# Patient Record
Sex: MALE | Race: WHITE
[De-identification: names, ages, dates, MRNs, and addresses within clinical notes are randomized per-mention and may not be internally consistent; named-entity substitution may affect disease eponyms.]

---

## 2017-06-17 NOTE — PD
HPI


Chief Complaint:  GI Complaint


Time Seen by Provider:  17:57


Travel History


International Travel<30 days:  No


Contact w/Intl Traveler<30days:  No


Traveled to known affect area:  No





History of Present Illness


HPI


51-year-old male presents emergency department for evaluation of generalized 

muscle cramping.  Patient states he has a history of renal cancer with a 

unilateral nephrectomy.  He is on chronic by mouth chemotherapy.  He is 

currently moving to the area and has been here for a week has not established 

with a oncologist in the area as of yet.  Does endorse some nonbloody diarrhea 

for the past week.  States his been told his potassium is been low in the past 

but he states the cramps of never been quite this bad.  Been taking Tylenol at 

home for the pain.  Thinks that a muscle relaxer might be helpful to him.  

Denies any fever.    Denies any chest pain abdominal pain nausea or vomiting.





PFSH


Past Medical History


ADHD:  Yes


Cancer:  Yes (STAGE 4 RENAL WITH METS)


Chemotherapy:  Yes


Thyroid Disease:  Yes (HYPO)


Tetanus Vaccination:  Unknown


Influenza Vaccination:  Yes





Past Surgical History


Genitourinary Surgery:  Yes (RIGHT NEPHRECTOMY)


Mastectomy:  Yes (RIGHT SIDE 20 YEARS AGO)


Tonsillectomy:  Yes


Other Surgery:  Yes (DEVIATED SEPTUM)





Social History


Alcohol Use:  No


Tobacco Use:  No


Substance Use:  No





Allergies-Medications


(Allergen,Severity, Reaction):  


Coded Allergies:  


     Erythromycin (Verified  Allergy, Unknown, Hives, 6/17/17)


Reported Meds & Prescriptions





Reported Meds & Active Scripts


Active


Flexeril (Cyclobenzaprine HCl) 10 Mg Tab 10 Mg PO TID PRN


Calcium Carbonate 1,500 Mg Tab 1,500 Mg PO DAILY 7 Days


     1,500 mg calcium carbonate (600 mg elemental calcium)


Levothyroxine (Levothyroxine Sodium) 200 Mcg Tab 200 Mcg PO DAILY


Reported


Vitamin B-12 (Cyanocobalamin) 1,000 Mcg Subl 1,000 Mcg SL DAILY


Tramadol (Tramadol HCl) 50 Mg Tab 50 Mg PO Q4H PRN


Linzess (Linaclotide) 145 Mcg Cap 145 Mcg PO DAILY


Alprazolam 0.25 Mg Tab 0.25 Mg PO DAILY


Ritalin IR (Methylphenidate HCl) 5 Mg Tab 5 Mg PO DAILY


Prochlorperazine Maleate 10 Mg Tab 10 Mg PO Q6H PRN


Senna (Sennosides) 8.8 Mg/5 Ml Syp 1 Tab PO DAILY


Pantoprazole (Pantoprazole Sodium) 40 Mg Tab 40 Mg PO DAILY


Fentanyl Patch 72 HR (Fentanyl) 25 Mcg/Hr Patch 25 Mcg T-DERMAL Q72H


Cabometyx (Cabozantinib S-Malate) 60 Mg Tablet 1 Tab PO DAILY








Review of Systems


Except as stated in HPI:  all other systems reviewed are Neg





Physical Exam


Narrative


GENERAL: Well-developed well-nourished, no apparent distress.


SKIN: Focused skin assessment warm/dry.


HEAD: Atraumatic. Normocephalic. 


EYES: Pupils equal and round. No scleral icterus. No injection or drainage. 


ENT: No nasal bleeding or discharge.  Mucous membranes pink and moist.


NECK: Trachea midline. No JVD. 


CARDIOVASCULAR: Regular rate and rhythm.  No murmur appreciated.


RESPIRATORY: No accessory muscle use. Clear to auscultation. Breath sounds 

equal bilaterally. 


GASTROINTESTINAL: Abdomen soft, non-tender, nondistended. Hepatic and splenic 

margins not palpable. 


MUSCULOSKELETAL: No obvious deformities. No clubbing.  No cyanosis.  No edema. 


NEUROLOGICAL: Awake and alert. No obvious cranial nerve deficits.  Motor 

grossly within normal limits. Normal speech.


PSYCHIATRIC: Appropriate mood and affect; insight and judgment normal.





Data


Data


Last Documented VS





Vital Signs








  Date Time  Temp Pulse Resp B/P Pulse Ox O2 Delivery O2 Flow Rate FiO2


 


6/17/17 20:45  78 18 132/80 97 Room Air  


 


6/17/17 18:55 98.1       








Orders





 Complete Blood Count With Diff (6/17/17 17:57)


Comprehensive Metabolic Panel (6/17/17 17:57)


Lipase (6/17/17 17:57)


Iv Access Insert/Monitor (6/17/17 17:57)


Ecg Monitoring (6/17/17 17:57)


Oximetry (6/17/17 17:57)


Sodium Chlor 0.9% 1000 Ml Inj (Ns 1000 M (6/17/17 17:57)


Sodium Chloride 0.9% Flush (Ns Flush) (6/17/17 18:00)


Electrocardiogram (6/17/17 17:57)


Cyclobenzaprine (Flexeril) (6/17/17 18:00)


Calcium Gluconate Inj (Calcium Gluconate (6/17/17 18:45)


Oxycodone-Acetamin 5-325 Mg (Percocet (6/17/17 19:00)


Ondansetron Inj (Zofran Inj) (6/17/17 19:00)


Thyroid Stimulating Hormone (6/17/17 18:10)





Labs





 Laboratory Tests








Test 6/17/17





 18:10


 


White Blood Count 5.4 TH/MM3


 


Red Blood Count 4.59 MIL/MM3


 


Hemoglobin 15.4 GM/DL


 


Hematocrit 46.4 %


 


Mean Corpuscular Volume 101.0 FL


 


Mean Corpuscular Hemoglobin 33.5 PG


 


Mean Corpuscular Hemoglobin 33.1 %





Concent 


 


Red Cell Distribution Width 14.4 %


 


Platelet Count 218 TH/MM3


 


Mean Platelet Volume 7.9 FL


 


Neutrophils (%) (Auto) 62.6 %


 


Lymphocytes (%) (Auto) 27.2 %


 


Monocytes (%) (Auto) 5.0 %


 


Eosinophils (%) (Auto) 4.3 %


 


Basophils (%) (Auto) 0.9 %


 


Neutrophils # (Auto) 3.4 TH/MM3


 


Lymphocytes # (Auto) 1.5 TH/MM3


 


Monocytes # (Auto) 0.3 TH/MM3


 


Eosinophils # (Auto) 0.2 TH/MM3


 


Basophils # (Auto) 0.0 TH/MM3


 


CBC Comment DIFF FINAL 


 


Differential Comment  


 


Sodium Level 141 MEQ/L


 


Potassium Level 3.3 MEQ/L


 


Chloride Level 109 MEQ/L


 


Carbon Dioxide Level 22.0 MEQ/L


 


Anion Gap 10 MEQ/L


 


Blood Urea Nitrogen 19 MG/DL


 


Creatinine 1.50 MG/DL


 


Estimat Glomerular Filtration 50 ML/MIN





Rate 


 


Random Glucose 104 MG/DL


 


Calcium Level 6.1 MG/DL


 


Protein Corrected Calcium 6.0 MG/DL


 


Total Bilirubin 0.7 MG/DL


 


Aspartate Amino Transf 56 U/L





(AST/SGOT) 


 


Alanine Aminotransferase 106 U/L





(ALT/SGPT) 


 


Alkaline Phosphatase 33 U/L


 


Total Protein 7.6 GM/DL


 


Albumin 3.5 GM/DL


 


Lipase 234 U/L


 


Thyroid Stimulating Hormone 35.600 uIU/ML





3rd Gen 











[facility]


Medical Decision Making


Medical Screen Exam Complete:  Yes


Emergency Medical Condition:  Yes


Interpretation(s)


EKG shows normal sinus rhythm with normal axis normal R-wave progression.  

Intervals completely within normal limits.  No concerning ST segment changes.  

This is a normal EKG.


Differential Diagnosis


Dehydration, electrolyte abnormality, diarrhea, acute abdomen is excluded 

clinically.


Narrative Course


Patient roomed emergency department, found to be notably hypocalcemic, was 

given 2 g of calcium gluconate IV, calcium carbonate to be prescribed.  Patient 

has had mild cramping which was easily controlled with Flexeril.  Discussed 

with the patient need for supplementation at home, patient then told me that he 

was having some low TSH and a recent blood test, he is currently 175 g daily 

will increase to 200 g daily after confirming his low TSH here.  Discussed he 

needs to establish with a primary care physician in [facility] if he plans to stay 

and he is working on that.  Discussed needs to have his labs rechecked in a 

week and he is welcome to return here.  Discussed return to ED criteria.  He is 

stable for discharge.





Diagnosis





 Primary Impression:  


 Hypocalcemia


 Additional Impressions:  


 Hypothyroidism


 Chronic diarrhea


***Med/Other Pt SpecificInfo:  Prescription(s) given


Scripts


Cyclobenzaprine (Flexeril)10 Mg Tab10 Mg PO TID PRN (MUSCLE SPASM) #30 TAB  Ref 

0


   Prov:Roger Mendoza MD         6/17/17 


Calcium Carbonate 1,500 Mg Tab1,500 Mg PO DAILY  7 Days  Ref 0


   1,500 mg calcium carbonate (600 mg elemental calcium)


   Prov:Roger Mendoza MD         6/17/17 


Levothyroxine 200 Mcg [id number] Mcg PO DAILY  #30 TAB  Ref 0


   Prov:Roger Mendoza MD         6/17/17


Disposition:  01 DISCHARGE HOME


Condition:  Stable








Roger Mendoza MD Jun 17, 2017 19:25

## 2017-06-18 NOTE — EKG
Date Performed: 06/17/2017       Time Performed: 18:07:26

 

PTAGE:      51 years

 

EKG:      Sinus rhythm 

 

 NORMAL ECG 

 

NO PREVIOUS TRACING            

 

DOCTOR:   Clifford Beauchamp  Interpretating Date/Time  06/18/2017 12:19:18

## 2018-01-28 ENCOUNTER — HOSPITAL ENCOUNTER (EMERGENCY)
Dept: HOSPITAL 17 - PHED | Age: 53
Discharge: HOME | End: 2018-01-28
Payer: COMMERCIAL

## 2018-01-28 VITALS
SYSTOLIC BLOOD PRESSURE: 124 MMHG | DIASTOLIC BLOOD PRESSURE: 65 MMHG | OXYGEN SATURATION: 98 % | RESPIRATION RATE: 16 BRPM | TEMPERATURE: 97.7 F | HEART RATE: 87 BPM

## 2018-01-28 VITALS — BODY MASS INDEX: 20.9 KG/M2 | WEIGHT: 141.1 LBS | HEIGHT: 69 IN

## 2018-01-28 VITALS — SYSTOLIC BLOOD PRESSURE: 128 MMHG | DIASTOLIC BLOOD PRESSURE: 73 MMHG

## 2018-01-28 VITALS
OXYGEN SATURATION: 99 % | DIASTOLIC BLOOD PRESSURE: 57 MMHG | SYSTOLIC BLOOD PRESSURE: 119 MMHG | RESPIRATION RATE: 16 BRPM | HEART RATE: 87 BPM

## 2018-01-28 VITALS
SYSTOLIC BLOOD PRESSURE: 114 MMHG | OXYGEN SATURATION: 98 % | HEART RATE: 81 BPM | DIASTOLIC BLOOD PRESSURE: 55 MMHG | RESPIRATION RATE: 16 BRPM

## 2018-01-28 DIAGNOSIS — K52.9: Primary | ICD-10-CM

## 2018-01-28 DIAGNOSIS — E87.6: ICD-10-CM

## 2018-01-28 DIAGNOSIS — C64.9: ICD-10-CM

## 2018-01-28 LAB
AMORPHOUS SEDIMENT, URINE: (no result)
BUN SERPL-MCNC: 11 MG/DL (ref 7–18)
CALCIUM SERPL-MCNC: 7.7 MG/DL (ref 8.5–10.1)
CHLORIDE SERPL-SCNC: 110 MEQ/L (ref 98–107)
COLOR UR: YELLOW
CREAT SERPL-MCNC: 0.92 MG/DL (ref 0.6–1.3)
GFR SERPLBLD BASED ON 1.73 SQ M-ARVRAT: 87 ML/MIN (ref 89–?)
GLUCOSE SERPL-MCNC: 85 MG/DL (ref 74–106)
GLUCOSE UR STRIP-MCNC: (no result) MG/DL
HCO3 BLD-SCNC: 27.8 MEQ/L (ref 21–32)
HGB UR QL STRIP: (no result)
KETONES UR STRIP-MCNC: (no result) MG/DL
NITRITE UR QL STRIP: (no result)
RBC #/AREA URNS HPF: (no result) /HPF (ref 0–3)
SODIUM SERPL-SCNC: 144 MEQ/L (ref 136–145)
SP GR UR STRIP: 1.02 (ref 1–1.03)
SQUAMOUS #/AREA URNS HPF: (no result) /HPF (ref 0–5)
URINE LEUKOCYTE ESTERASE: (no result)

## 2018-01-28 PROCEDURE — 87493 C DIFF AMPLIFIED PROBE: CPT

## 2018-01-28 PROCEDURE — 96360 HYDRATION IV INFUSION INIT: CPT

## 2018-01-28 PROCEDURE — 99284 EMERGENCY DEPT VISIT MOD MDM: CPT

## 2018-01-28 PROCEDURE — 81001 URINALYSIS AUTO W/SCOPE: CPT

## 2018-01-28 PROCEDURE — 80048 BASIC METABOLIC PNL TOTAL CA: CPT

## 2018-01-28 PROCEDURE — 96372 THER/PROPH/DIAG INJ SC/IM: CPT

## 2018-01-28 NOTE — PD
HPI


Chief Complaint:  GI Complaint


Time Seen by Provider:  10:07


Travel History


International Travel<30 days:  Yes


Contact w/Intl Traveler<30days:  Yes


Name of Country Traveled to:  [place], [place]


Traveled to known affect area:  No





History of Present Illness


HPI


Patient presents with concerns of urinary tract infection and dehydration.  

Reports right sided nephrectomy secondary to renal cancer with chronic 

diarrhea.  Denies any blood per stool.  Currently undergoing daily cancer 

treatment.  Denies any nausea or vomiting.  Denies any camping.  Admits to 

recent travel however bowel symptoms or present prior to this.  Denies any 

recent antibiotics.





PFSH


Past Medical History


ADHD:  Yes


Cancer:  Yes (STAGE 4 RENAL WITH METS)


Chemotherapy:  Yes


Immunizations Current:  Yes


Thyroid Disease:  Yes (HYPO)


Tetanus Vaccination:  < 5 Years





Past Surgical History


Genitourinary Surgery:  Yes (RIGHT NEPHRECTOMY)


Mastectomy:  Yes (RIGHT SIDE 20 YEARS AGO)


Tonsillectomy:  Yes


Other Surgery:  Yes (DEVIATED SEPTUM)





Social History


Alcohol Use:  No


Tobacco Use:  No


Substance Use:  No





Allergies-Medications


(Allergen,Severity, Reaction):  


Coded Allergies:  


     erythromycin base (Verified  Allergy, Unknown, Hives, 1/28/18)


Reported Meds & Prescriptions





Reported Meds & Active Scripts


Active


Flexeril (Cyclobenzaprine HCl) 10 Mg Tab 10 Mg PO TID PRN


Calcium Carbonate 1,500 Mg Tab 1,500 Mg PO DAILY 7 Days


     1,500 mg calcium carbonate (600 mg elemental calcium)


Levothyroxine (Levothyroxine Sodium) 200 Mcg Tab 200 Mcg PO DAILY


Reported


Oxycodone (Oxycodone HCl) 5 Mg Cap Unknown Dose PO Q4H PRN


Vitamin B-12 (Cyanocobalamin) 1,000 Mcg Subl 1,000 Mcg SL DAILY


Tramadol (Tramadol HCl) 50 Mg Tab 50 Mg PO Q4H PRN


Alprazolam 0.25 Mg Tab 0.25 Mg PO DAILY


Ritalin IR (Methylphenidate HCl) 5 Mg Tab 5 Mg PO DAILY


Prochlorperazine Maleate 10 Mg Tab 10 Mg PO Q6H PRN


Pantoprazole (Pantoprazole Sodium) 40 Mg Tab 40 Mg PO DAILY


Fentanyl Patch 72 HR (Fentanyl) 25 Mcg/Hr Patch 25 Mcg T-DERMAL Q72H


Cabometyx (Cabozantinib S-Malate) 60 Mg Tablet 1 Tab PO DAILY








Review of Systems


General / Constitutional:  No: Fever


Eyes:  No: Visual changes


HENT:  No: Headaches


Cardiovascular:  No: Chest Pain or Discomfort


Respiratory:  No: Shortness of Breath


Gastrointestinal:  Positive: Diarrhea, No: Abdominal Pain


Genitourinary:  No: Dysuria


Musculoskeletal:  No: Pain


Skin:  No Rash


Neurologic:  No: Weakness


Psychiatric:  No: Depression


Endocrine:  No: Polydipsia


Hematologic/Lymphatic:  No: Easy Bruising





Physical Exam


Narrative


GENERAL: Well-nourished, well-developed patient.


SKIN: Focused skin assessment warm/dry.


HEAD: Normocephalic.


EYES: No scleral icterus. No injection or drainage. 


NECK: Supple, trachea midline. No JVD or lymphadenopathy.


CARDIOVASCULAR: Regular rate and rhythm without murmurs, gallops, or rubs. 


RESPIRATORY: Breath sounds equal bilaterally. No accessory muscle use.


GASTROINTESTINAL: Diffusely tender no hepatosplenomegaly


MUSCULOSKELETAL: No cyanosis, or edema. 


BACK: Nontender without obvious deformity. No CVA tenderness.





Data


Data


Last Documented VS





Vital Signs








  Date Time  Temp Pulse Resp B/P (MAP) Pulse Ox O2 Delivery O2 Flow Rate FiO2


 


1/28/18 10:33  81 16 114/55 (74) 98 Room Air  


 


1/28/18 08:40 97.7       








Orders





 Orders


Urinalysis - C+S If Indicated (1/28/18 10:07)


C Diff Toxin Pcr (1/28/18 10:07)


Sodium Chlor 0.9% 1000 Ml Inj (Ns 1000 M (1/28/18 10:15)


Dicyclomine Inj (Bentyl Inj) (1/28/18 10:15)


Basic Metabolic Panel (Bmp) (1/28/18 10:46)


Potassium Chloride (Kcl) (1/28/18 11:45)





Labs





Laboratory Tests








Test


  1/28/18


10:10 1/28/18


10:15 1/28/18


10:40


 


Urine Collection Type CLEAN CATCH   


 


Urine Color YELLOW   


 


Urine Turbidity CLEAR   


 


Urine pH 6.0   


 


Urine Specific Gravity 1.016   


 


Urine Protein NEG mg/dL   


 


Urine Glucose (UA) NEG mg/dL   


 


Urine Ketones NEG mg/dL   


 


Urine Occult Blood NEG   


 


Urine Nitrite NEG   


 


Urine Bilirubin NEG   


 


Urine Leukocyte Esterase NEG   


 


Urine RBC 0-3 /hpf   


 


Urine Squamous Epithelial


Cells 0-5 /hpf 


  


  


 


 


Urine Amorphous Sediment FEW   


 


Microscopic Urinalysis Comment


  CULT NOT


INDICATED 


  


 


 


Urine Collection Time 1010   


 


Blood Urea Nitrogen   11 MG/DL 


 


Creatinine   0.92 MG/DL 


 


Random Glucose   85 MG/DL 


 


Calcium Level   7.7 MG/DL 


 


Sodium Level   144 MEQ/L 


 


Potassium Level   3.4 MEQ/L 


 


Chloride Level   110 MEQ/L 


 


Carbon Dioxide Level   27.8 MEQ/L 


 


Anion Gap   6 MEQ/L 


 


Estimat Glomerular Filtration


Rate 


  


  87 ML/MIN 


 











MDM


Medical Decision Making


Medical Screen Exam Complete:  Yes


Emergency Medical Condition:  Yes


Differential Diagnosis


Medication side effect, enteritis, colitis diarrhea, C. difficile, UTI, 

dehydration


Narrative Course


Assessment and plan discussed with patient and wife at bedside.





Diagnosis





 Primary Impression:  


 Chronic diarrhea


 Additional Impression:  


 Hypokalemia


Patient Instructions:  General Instructions





***Additional Instructions:  


Encouraged high-fiber heart healthy diet.  Anti-spasmodic as directed.  Follow-

up with oncology and discussed results of C. difficile studies.  Return to the 

emergency room with any onset of new symptoms.


***Med/Other Pt SpecificInfo:  Prescription(s) given


Scripts


Hyoscyamine (Levsin) 0.125 Mg Tab


0.125 MG PO Q4H for Gastrointestinal disorders, #30 TAB 0 Refills


   Prov: Romulo Zavala MD         1/28/18


Disposition:  01 DISCHARGE HOME


Condition:  Good











Romulo Zavala MD Jan 28, 2018 10:12

## 2018-03-19 ENCOUNTER — HOSPITAL ENCOUNTER (EMERGENCY)
Dept: HOSPITAL 17 - PHED | Age: 53
Discharge: LEFT BEFORE DISCHARGE | End: 2018-03-19
Payer: COMMERCIAL

## 2018-03-19 DIAGNOSIS — R21: Primary | ICD-10-CM

## 2018-03-19 PROCEDURE — 99281 EMR DPT VST MAYX REQ PHY/QHP: CPT
